# Patient Record
Sex: FEMALE | Race: OTHER | Employment: FULL TIME | URBAN - METROPOLITAN AREA
[De-identification: names, ages, dates, MRNs, and addresses within clinical notes are randomized per-mention and may not be internally consistent; named-entity substitution may affect disease eponyms.]

---

## 2020-08-05 ENCOUNTER — HOSPITAL ENCOUNTER (OUTPATIENT)
Dept: GENERAL RADIOLOGY | Age: 45
Discharge: HOME | End: 2020-08-05
Payer: MEDICAID

## 2020-08-05 DIAGNOSIS — M54.9 BACK PAIN: ICD-10-CM

## 2020-08-05 PROCEDURE — 74019 RADEX ABDOMEN 2 VIEWS: CPT

## 2021-12-21 ENCOUNTER — HOSPITAL ENCOUNTER (OUTPATIENT)
Dept: GENERAL RADIOLOGY | Age: 46
Discharge: HOME | End: 2021-12-21
Payer: MEDICAID

## 2021-12-21 DIAGNOSIS — K59.00 CONSTIPATION: ICD-10-CM

## 2021-12-21 PROCEDURE — 74018 RADEX ABDOMEN 1 VIEW: CPT

## 2022-03-30 ENCOUNTER — TRANSCRIBE ORDER (OUTPATIENT)
Dept: SCHEDULING | Age: 47
End: 2022-03-30

## 2022-03-30 DIAGNOSIS — Z12.39 BREAST CANCER SCREENING: Primary | ICD-10-CM

## 2024-06-29 ENCOUNTER — HOSPITAL ENCOUNTER (OUTPATIENT)
Facility: HOSPITAL | Age: 49
End: 2024-06-29
Payer: MEDICAID

## 2024-06-29 DIAGNOSIS — N20.0 KIDNEY STONE: ICD-10-CM

## 2024-06-29 PROCEDURE — 76770 US EXAM ABDO BACK WALL COMP: CPT

## 2024-07-05 ENCOUNTER — HOSPITAL ENCOUNTER (OUTPATIENT)
Facility: HOSPITAL | Age: 49
Discharge: HOME | End: 2024-07-08

## 2024-07-05 ENCOUNTER — OFFICE VISIT (OUTPATIENT)
Facility: CLINIC | Age: 49
End: 2024-07-05
Payer: MEDICAID

## 2024-07-05 VITALS
WEIGHT: 205 LBS | HEIGHT: 68 IN | DIASTOLIC BLOOD PRESSURE: 86 MMHG | SYSTOLIC BLOOD PRESSURE: 128 MMHG | HEART RATE: 96 BPM | BODY MASS INDEX: 31.07 KG/M2 | TEMPERATURE: 97.8 F | OXYGEN SATURATION: 94 % | RESPIRATION RATE: 18 BRPM

## 2024-07-05 DIAGNOSIS — N20.0 RENAL STONE: ICD-10-CM

## 2024-07-05 DIAGNOSIS — M54.50 LOW BACK PAIN, UNSPECIFIED BACK PAIN LATERALITY, UNSPECIFIED CHRONICITY, UNSPECIFIED WHETHER SCIATICA PRESENT: ICD-10-CM

## 2024-07-05 DIAGNOSIS — Z13.29 SCREENING FOR ENDOCRINE, METABOLIC AND IMMUNITY DISORDER: ICD-10-CM

## 2024-07-05 DIAGNOSIS — Z13.228 SCREENING FOR ENDOCRINE, METABOLIC AND IMMUNITY DISORDER: ICD-10-CM

## 2024-07-05 DIAGNOSIS — Z12.4 SCREENING FOR CERVICAL CANCER: ICD-10-CM

## 2024-07-05 DIAGNOSIS — R91.1 LUNG NODULE: ICD-10-CM

## 2024-07-05 DIAGNOSIS — Z12.31 ENCOUNTER FOR SCREENING MAMMOGRAM FOR MALIGNANT NEOPLASM OF BREAST: ICD-10-CM

## 2024-07-05 DIAGNOSIS — Z13.0 SCREENING FOR ENDOCRINE, METABOLIC AND IMMUNITY DISORDER: ICD-10-CM

## 2024-07-05 DIAGNOSIS — Z12.11 SCREEN FOR COLON CANCER: Primary | ICD-10-CM

## 2024-07-05 DIAGNOSIS — K59.00 CONSTIPATION, UNSPECIFIED CONSTIPATION TYPE: ICD-10-CM

## 2024-07-05 LAB — LABCORP SPECIMEN COLLECTION: NORMAL

## 2024-07-05 PROCEDURE — 99204 OFFICE O/P NEW MOD 45 MIN: CPT | Performed by: INTERNAL MEDICINE

## 2024-07-05 PROCEDURE — 99001 SPECIMEN HANDLING PT-LAB: CPT

## 2024-07-05 RX ORDER — POLYETHYLENE GLYCOL 3350 17 G/17G
17 POWDER, FOR SOLUTION ORAL DAILY
Qty: 1530 G | Refills: 0 | Status: SHIPPED | OUTPATIENT
Start: 2024-07-05

## 2024-07-05 SDOH — ECONOMIC STABILITY: FOOD INSECURITY: WITHIN THE PAST 12 MONTHS, THE FOOD YOU BOUGHT JUST DIDN'T LAST AND YOU DIDN'T HAVE MONEY TO GET MORE.: NEVER TRUE

## 2024-07-05 SDOH — ECONOMIC STABILITY: HOUSING INSECURITY
IN THE LAST 12 MONTHS, WAS THERE A TIME WHEN YOU DID NOT HAVE A STEADY PLACE TO SLEEP OR SLEPT IN A SHELTER (INCLUDING NOW)?: NO

## 2024-07-05 SDOH — ECONOMIC STABILITY: INCOME INSECURITY: HOW HARD IS IT FOR YOU TO PAY FOR THE VERY BASICS LIKE FOOD, HOUSING, MEDICAL CARE, AND HEATING?: NOT HARD AT ALL

## 2024-07-05 SDOH — ECONOMIC STABILITY: FOOD INSECURITY: WITHIN THE PAST 12 MONTHS, YOU WORRIED THAT YOUR FOOD WOULD RUN OUT BEFORE YOU GOT MONEY TO BUY MORE.: NEVER TRUE

## 2024-07-05 ASSESSMENT — PATIENT HEALTH QUESTIONNAIRE - PHQ9
1. LITTLE INTEREST OR PLEASURE IN DOING THINGS: NOT AT ALL
SUM OF ALL RESPONSES TO PHQ QUESTIONS 1-9: 0
SUM OF ALL RESPONSES TO PHQ9 QUESTIONS 1 & 2: 0
SUM OF ALL RESPONSES TO PHQ QUESTIONS 1-9: 0
2. FEELING DOWN, DEPRESSED OR HOPELESS: NOT AT ALL
SUM OF ALL RESPONSES TO PHQ QUESTIONS 1-9: 0
SUM OF ALL RESPONSES TO PHQ QUESTIONS 1-9: 0

## 2024-07-05 ASSESSMENT — ENCOUNTER SYMPTOMS
CONSTIPATION: 1
BACK PAIN: 1
ALLERGIC/IMMUNOLOGIC NEGATIVE: 1
EYES NEGATIVE: 1
RESPIRATORY NEGATIVE: 1

## 2024-07-05 NOTE — PROGRESS NOTES
\"Have you been to the ER, urgent care clinic since your last visit?  Hospitalized since your last visit?\"    NO    “Have you seen or consulted any other health care providers outside of [facility] since your last visit?”    NO    “Have you had a colorectal cancer screening such as a colonoscopy/FIT/Cologuard?    NO    No colonoscopy on file  No cologuard on file  No FIT/FOBT on file   No flexible sigmoidoscopy on file        Have you had a mammogram?”   NO    Date of last Mammogram: 6/27/2017      “Have you had a pap smear?”    NO    No cervical cancer screening on file

## 2024-07-05 NOTE — PROGRESS NOTES
Subjective:   Hannah Perez was seen today for New Patient    Patient does not have any chest pain or palpitations or shortness of breath.  No   symptoms.      Patient has history of renal stones. Patient follows with urology.    Recent CT scan on 5/17 had shown   5-6 mm diameter distal right ureteral calculus just above the UVJ causing obstruction.   2. Tiny nonobstructing right upper pole renal calculus and tiny calculus within dependent portion of bladder.   3. Enlarged multi fibroid uterus.   4. 13 mm nodule right lower lobe. Possibility of primary or metastatic neoplasm should be considered. Further evaluation with CT dedicated to the chest suggested.     Patient also complains of low back pain.  Patient would like to follow-up with spine surgery.    Patient is overdue for mammogram, colonoscopy, Pap smear.    Patient does not smoke or do drugs.  Patient  take alcohol occasionally.      Past Medical History:   Diagnosis Date    Hypertension     Kidney stone        Past Surgical History:   Procedure Laterality Date    ENDOMETRIAL ABLATION         Family History   Problem Relation Age of Onset    Ovarian Cancer Mother        Social History     Socioeconomic History    Marital status: Single     Spouse name: Not on file    Number of children: Not on file    Years of education: Not on file    Highest education level: Not on file   Occupational History    Not on file   Tobacco Use    Smoking status: Never    Smokeless tobacco: Never   Vaping Use    Vaping Use: Never used   Substance and Sexual Activity    Alcohol use: Not Currently     Alcohol/week: 1.0 standard drink of alcohol     Types: 1 Shots of liquor per week    Drug use: Never    Sexual activity: Not Currently   Other Topics Concern    Not on file   Social History Narrative    Not on file     Social Determinants of Health     Financial Resource Strain: Low Risk  (7/5/2024)    Overall Financial Resource Strain (CARDIA)     Difficulty of Paying Living

## 2024-07-06 LAB
ALBUMIN SERPL-MCNC: 4.3 G/DL (ref 3.9–4.9)
ALP SERPL-CCNC: 119 IU/L (ref 44–121)
ALT SERPL-CCNC: 18 IU/L (ref 0–32)
AST SERPL-CCNC: 18 IU/L (ref 0–40)
BASOPHILS # BLD AUTO: 0 X10E3/UL (ref 0–0.2)
BASOPHILS NFR BLD AUTO: 1 %
BILIRUB SERPL-MCNC: 0.9 MG/DL (ref 0–1.2)
BUN SERPL-MCNC: 14 MG/DL (ref 6–24)
BUN/CREAT SERPL: 18 (ref 9–23)
CALCIUM SERPL-MCNC: 9.2 MG/DL (ref 8.7–10.2)
CHLORIDE SERPL-SCNC: 103 MMOL/L (ref 96–106)
CHOLEST SERPL-MCNC: 197 MG/DL (ref 100–199)
CO2 SERPL-SCNC: 22 MMOL/L (ref 20–29)
CREAT SERPL-MCNC: 0.79 MG/DL (ref 0.57–1)
EGFRCR SERPLBLD CKD-EPI 2021: 92 ML/MIN/1.73
EOSINOPHIL # BLD AUTO: 0.1 X10E3/UL (ref 0–0.4)
EOSINOPHIL NFR BLD AUTO: 2 %
ERYTHROCYTE [DISTWIDTH] IN BLOOD BY AUTOMATED COUNT: 14.5 % (ref 11.7–15.4)
GLOBULIN SER CALC-MCNC: 2.9 G/DL (ref 1.5–4.5)
GLUCOSE SERPL-MCNC: 83 MG/DL (ref 70–99)
HBA1C MFR BLD: 5.9 % (ref 4.8–5.6)
HCT VFR BLD AUTO: 42.8 % (ref 34–46.6)
HDLC SERPL-MCNC: 54 MG/DL
HGB BLD-MCNC: 13.5 G/DL (ref 11.1–15.9)
IMM GRANULOCYTES # BLD AUTO: 0 X10E3/UL (ref 0–0.1)
IMM GRANULOCYTES NFR BLD AUTO: 0 %
LDLC SERPL CALC-MCNC: 126 MG/DL (ref 0–99)
LYMPHOCYTES # BLD AUTO: 1.8 X10E3/UL (ref 0.7–3.1)
LYMPHOCYTES NFR BLD AUTO: 43 %
MCH RBC QN AUTO: 26.9 PG (ref 26.6–33)
MCHC RBC AUTO-ENTMCNC: 31.5 G/DL (ref 31.5–35.7)
MCV RBC AUTO: 85 FL (ref 79–97)
MONOCYTES # BLD AUTO: 0.4 X10E3/UL (ref 0.1–0.9)
MONOCYTES NFR BLD AUTO: 10 %
NEUTROPHILS # BLD AUTO: 1.8 X10E3/UL (ref 1.4–7)
NEUTROPHILS NFR BLD AUTO: 44 %
PLATELET # BLD AUTO: 339 X10E3/UL (ref 150–450)
POTASSIUM SERPL-SCNC: 4.2 MMOL/L (ref 3.5–5.2)
PROT SERPL-MCNC: 7.2 G/DL (ref 6–8.5)
RBC # BLD AUTO: 5.02 X10E6/UL (ref 3.77–5.28)
SODIUM SERPL-SCNC: 141 MMOL/L (ref 134–144)
TRIGL SERPL-MCNC: 94 MG/DL (ref 0–149)
VLDLC SERPL CALC-MCNC: 17 MG/DL (ref 5–40)
WBC # BLD AUTO: 4.1 X10E3/UL (ref 3.4–10.8)

## 2024-08-12 RX ORDER — NITROFURANTOIN 25; 75 MG/1; MG/1
100 CAPSULE ORAL EVERY 12 HOURS
COMMUNITY

## 2024-08-21 ENCOUNTER — OFFICE VISIT (OUTPATIENT)
Age: 49
End: 2024-08-21
Payer: MEDICAID

## 2024-08-21 VITALS
WEIGHT: 206.8 LBS | OXYGEN SATURATION: 97 % | HEART RATE: 98 BPM | BODY MASS INDEX: 31.34 KG/M2 | DIASTOLIC BLOOD PRESSURE: 97 MMHG | TEMPERATURE: 98 F | RESPIRATION RATE: 18 BRPM | HEIGHT: 68 IN | SYSTOLIC BLOOD PRESSURE: 155 MMHG

## 2024-08-21 DIAGNOSIS — R91.1 LUNG NODULE: Primary | ICD-10-CM

## 2024-08-21 DIAGNOSIS — R06.02 SHORTNESS OF BREATH: ICD-10-CM

## 2024-08-21 PROCEDURE — 99204 OFFICE O/P NEW MOD 45 MIN: CPT | Performed by: HOSPITALIST

## 2024-08-21 ASSESSMENT — PATIENT HEALTH QUESTIONNAIRE - PHQ9
1. LITTLE INTEREST OR PLEASURE IN DOING THINGS: NOT AT ALL
SUM OF ALL RESPONSES TO PHQ QUESTIONS 1-9: 0
SUM OF ALL RESPONSES TO PHQ9 QUESTIONS 1 & 2: 0
2. FEELING DOWN, DEPRESSED OR HOPELESS: NOT AT ALL
SUM OF ALL RESPONSES TO PHQ QUESTIONS 1-9: 0

## 2024-08-21 NOTE — PROGRESS NOTES
Hannah Hoffmanlett presents today for   Chief Complaint   Patient presents with    Pulmonary Nodule       Is someone accompanying this pt? No    Is the patient using any DME equipment during OV? No    -DME Company No    Depression Screenin/21/2024     9:14 AM   PHQ-9 Questionaire   Little interest or pleasure in doing things 0   Feeling down, depressed, or hopeless 0   PHQ-9 Total Score 0       Learning Assessment:    Failed to redirect to the Timeline version of the [employer] SmartLink.    Abuse Screening:         No data to display                Fall Risk    Failed to redirect to the Timeline version of the [employer] SmartLink.    Coordination of Care:    1. Have you been to the ER, urgent care clinic since your last visit? Hospitalized since your last visit? No    2. Have you seen or consulted any other health care providers outside of the [facility] since your last visit? Include any pap smears or colon screening. No    Medication list has been update per patient.

## 2024-08-21 NOTE — PROGRESS NOTES
[facility]                                                       Pulmonary, Critical Care, and Sleep Medicine      Pulmonary Office Initial Consultation.    Name: Hannah Perez     : 1975     Date: 2024        Subjective:   Chief complaint: lung nodule  Patient is a 48 y.o. female with a PMHx of nephrolithiasis presenting for evaluation of lung nodule found incidentally on CTAP    HPI :  Today in clinic, she states she is here to discuss the finding of a lung nodule on recent CT of abdomen pelvis done May 2024.  She does have some dyspnea exertion which feels winded with activity but denies associated cough, wheeze, sputum production, hemoptysis and usually it improves when she sits down.  She has no known history of structural lung disease to include COPD or asthma.  She does have symptoms of lower back pain which he has not coming appointment with the spine specialist for, but denies symptoms of fever, chills, nausea, vomiting, chest pain or tightness, abdominal pain, or significant leg swelling.  She has never been on any inhalers or respiratory treatments.  Denies symptoms of nasal congestion or history respiratory illnesses.  She is a lifelong non-smoker.  Also denies marijuana, vaping, or other substance use.  Denies significant travel history.  Denies risk of exposure to dust, mold, chemicals or job.  Does note some occasional mildew in her house but no significant water leak or damage, or animal exposure.  As far she is aware no history of TB exposure.  She is not up-to-date influenza or COVID-vaccine and declines as vaccines despite counseling on risks    CT scan result was discussed with her including characteristics of nodule and her personal risk.  She has no personal history of malignancy.  She does note a family history ovarian cancer in her mother side was diagnosed between age 50 and 60.  Denies any

## 2024-08-28 NOTE — PROGRESS NOTES
[facility]  [address]  [place] [address]  Tel: [phone]  Fax: [phone]          INITIAL CONSULTATION      HISTORY OF PRESENT ILLNESS:  Hannah Perez is a 48 y.o. female who is referred from Charly Abernathy MD secondary to low back pain. She rates her pain  9 /10. Patient comes into the office with c/o lower back pain with symptoms into the right groin down the right lateral thigh ongoing for 2+ months with no injury    Patient says her pain has been holding steady since the initial onset. She denies change in bowel or bladder habits. She says to her knowledge she is not pregnant, trying to become pregnant, or breast feeding at this time. Her pain is exacerbated by standing and bending over She denies recent fevers, weight loss, rashes, or skin sores. She denies a hx of stomach ulcers or bleeding disorders. She denies a hx of history of spinal surgery or injections. She denies recent physical therapy or chiropractic care. She denies a hx of DM. She reports that to her knowledge her kidneys function properly, GFR of 92 on 7/5/2024. She has not taken anything for her pain.       PmHx of HTN, lung nodules, kidney stones     Note from Dr. Abernathy dated 7/5/2024 indicating patient was seen for hx of renal stones followed by urology.       The patient is Right Handed.  reviewed. Body mass index is 31.93 kg/m².    PCP: Charly Abernathy MD    Past Medical History:   Diagnosis Date    Hypertension     Kidney stone           Past Surgical History:   Procedure Laterality Date    ENDOMETRIAL ABLATION           Social History     Tobacco Use    Smoking status: Never     Passive exposure: Never    Smokeless tobacco: Never   Substance Use Topics    Alcohol use: Not Currently     Alcohol/week: 1.0 standard drink of alcohol     Types: 1 Shots of liquor per week       Work status: N/A.  Marital status: N/A.     Current Outpatient Medications   Medication Sig Dispense  a Medrol Dosepak, followed by Motrin 800 mg TID with food for 2 weeks after the MDP. I told her to go to the ER if she experiences bowel or bladder incontinence. I told her to call the office if she is experiencing BLE weakness. I will provide the pt with a work note prohibiting her from lifting >10lbs and to limit pushing and pulling. The patient is Neurologically intact. I will see the patient back in 2 months or earlier if needed.       Written by jenn Foster scribe, as dictated by Mikey Canela MD  I examined the patient, reviewed and agree with the note.

## 2024-08-29 ENCOUNTER — OFFICE VISIT (OUTPATIENT)
Age: 49
End: 2024-08-29
Payer: MEDICAID

## 2024-08-29 VITALS — WEIGHT: 210 LBS | TEMPERATURE: 97 F | BODY MASS INDEX: 31.83 KG/M2 | HEIGHT: 68 IN

## 2024-08-29 DIAGNOSIS — M54.16 LUMBAR NEURITIS: ICD-10-CM

## 2024-08-29 DIAGNOSIS — G89.29 CHRONIC BILATERAL LOW BACK PAIN, UNSPECIFIED WHETHER SCIATICA PRESENT: Primary | ICD-10-CM

## 2024-08-29 DIAGNOSIS — M54.50 CHRONIC BILATERAL LOW BACK PAIN, UNSPECIFIED WHETHER SCIATICA PRESENT: Primary | ICD-10-CM

## 2024-08-29 DIAGNOSIS — M51.36 DDD (DEGENERATIVE DISC DISEASE), LUMBAR: ICD-10-CM

## 2024-08-29 PROCEDURE — 72110 X-RAY EXAM L-2 SPINE 4/>VWS: CPT | Performed by: PHYSICAL MEDICINE & REHABILITATION

## 2024-08-29 PROCEDURE — 99204 OFFICE O/P NEW MOD 45 MIN: CPT | Performed by: PHYSICAL MEDICINE & REHABILITATION

## 2024-08-29 RX ORDER — METHYLPREDNISOLONE 4 MG
TABLET, DOSE PACK ORAL
Qty: 1 KIT | Refills: 0 | Status: SHIPPED | OUTPATIENT
Start: 2024-08-29

## 2024-08-29 RX ORDER — IBUPROFEN 800 MG/1
800 TABLET, FILM COATED ORAL 3 TIMES DAILY PRN
Qty: 45 TABLET | Refills: 0 | Status: SHIPPED | OUTPATIENT
Start: 2024-08-29

## 2024-08-29 NOTE — PATIENT INSTRUCTIONS
Dr Canela is ordering a Medrol Dosepak (steroid pack) and motrin 800 mg to your pharmacy. You may start the Medrol immediately, but do not take the motrin or any other anti-inflammatory medication until the Medrol Dosepak is completed.    You will receive a call from physical therapy. If you do not hear from them in over a week, please call.

## 2024-09-04 ENCOUNTER — TELEPHONE (OUTPATIENT)
Age: 49
End: 2024-09-04

## 2024-09-04 NOTE — TELEPHONE ENCOUNTER
Patient called to request that her last work note include that the restrictions will be reevaluated at her next visit with us 10/21/2024 if possible. She is on her way to the [facility] office to get the updated letter per her HR.

## 2024-09-04 NOTE — TELEPHONE ENCOUNTER
Patient presented to [facility] office and letter was rewritten as requested.

## 2024-09-12 ENCOUNTER — HOSPITAL ENCOUNTER (OUTPATIENT)
Facility: HOSPITAL | Age: 49
Setting detail: RECURRING SERIES
Discharge: HOME | End: 2024-09-15
Payer: MEDICAID

## 2024-09-12 PROCEDURE — 97162 PT EVAL MOD COMPLEX 30 MIN: CPT | Performed by: PHYSICAL THERAPIST

## 2024-09-25 ENCOUNTER — TELEPHONE (OUTPATIENT)
Facility: HOSPITAL | Age: 49
End: 2024-09-25

## 2024-10-08 ENCOUNTER — HOSPITAL ENCOUNTER (OUTPATIENT)
Facility: HOSPITAL | Age: 49
Setting detail: RECURRING SERIES
Discharge: HOME | End: 2024-10-11
Payer: MEDICAID

## 2024-10-08 PROCEDURE — 97112 NEUROMUSCULAR REEDUCATION: CPT

## 2024-10-08 PROCEDURE — 97110 THERAPEUTIC EXERCISES: CPT

## 2024-10-08 NOTE — PROGRESS NOTES
PHYSICAL / OCCUPATIONAL THERAPY - DAILY TREATMENT NOTE    Patient Name: Hannah Perez    Date: 10/8/2024    : 1975  Insurance: Payor: [facility] MEDICAID / Plan: [facility] EXP ANTH HEALTHKEEPERS PLUS / Product Type: *No Product type* /      Patient  verified Yes     Visit #   Current / Total 2 8   Time   In / Out 3:40 4:15   Pain   In / Out 5 5   Subjective Functional Status/Changes: Pt. Has no new complaints today.     TREATMENT AREA =  Other low back pain [M54.59]     OBJECTIVE    Therapeutic Procedures:    13484 Therapeutic Exercise (timed):  increase ROM, strength, coordination, balance, and proprioception to improve patient's ability to progress to PLOF and address remaining functional goals.   Tx Min Billable or 1:1 Min   (if diff from Tx Min) Details:   15  See flow sheet as applicable     60449 Neuromuscular Re-Education (timed):  improve balance, coordination, kinesthetic sense, posture, core stability and proprioception to improve patient's ability to develop conscious control of individual muscles and awareness of position of extremities in order to progress to PLOF and address remaining functional goals.   Tx Min Billable or 1:1 Min   (if diff from Tx Min) Details:   10  See flow sheet as applicable and MET to decrease L lumbar paraspinal hypomobility     56802 Therapeutic Activity (timed):  use of dynamic activities replicating functional movements to increase ROM, strength, coordination, balance, and proprioception in order to improve patient's ability to progress to PLOF and address remaining functional goals.   Tx Min Billable or 1:1 Min   (if diff from Tx Min) Details:   10  See flow sheet as applicable       35  [facility] Totals Reminder: bill using total billable min of TIMED therapeutic procedures (example: do not include dry needle or estim unattended, both untimed codes, in totals to left)  8-22 min = 1 unit; 23-37 min = 2 units; 38-52 min = 3 units; 53-67 min = 4 units; 68-82 min

## 2024-10-10 ENCOUNTER — HOSPITAL ENCOUNTER (OUTPATIENT)
Facility: HOSPITAL | Age: 49
Setting detail: RECURRING SERIES
Discharge: HOME | End: 2024-10-13
Payer: MEDICAID

## 2024-10-10 PROCEDURE — 97530 THERAPEUTIC ACTIVITIES: CPT

## 2024-10-10 PROCEDURE — 97110 THERAPEUTIC EXERCISES: CPT

## 2024-10-10 PROCEDURE — 97112 NEUROMUSCULAR REEDUCATION: CPT

## 2024-10-10 NOTE — PROGRESS NOTES
PHYSICAL / OCCUPATIONAL THERAPY - DAILY TREATMENT NOTE    Patient Name: Hannah Perez    Date: 10/10/2024    : 1975  Insurance: Payor: [facility] MEDICAID / Plan: [facility] EXP ANTH HEALTHKEEPERS PLUS / Product Type: *No Product type* /      Patient  verified Yes     Visit #   Current / Total 3 8   Time   In / Out 417 514   Pain   In / Out 6 6   Subjective Functional Status/Changes: Patient states she has not been performing her home exercises regularly.      TREATMENT AREA =  Other low back pain [M54.59]     OBJECTIVE    Heat (UNBILLED):  location/position: prone; back  .    Min Rationale   10 decrease pain to improve patient's ability to progress to PLOF and address remaining functional goals.     Skin assessment post-treatment:   Intact     Therapeutic Procedures:    10519 Therapeutic Exercise (timed):  increase ROM, strength, coordination, balance, and proprioception to improve patient's ability to progress to PLOF and address remaining functional goals.   Tx Min Billable or 1:1 Min   (if diff from Tx Min) Details:   17 15 See flow sheet as applicable     48933 Neuromuscular Re-Education (timed):  improve balance, coordination, kinesthetic sense, posture, core stability and proprioception to improve patient's ability to develop conscious control of individual muscles and awareness of position of extremities in order to progress to PLOF and address remaining functional goals.   Tx Min Billable or 1:1 Min   (if diff from Tx Min) Details:   15 15 See flow sheet as applicable     66438 Therapeutic Activity (timed):  use of dynamic activities replicating functional movements to increase ROM, strength, coordination, balance, and proprioception in order to improve patient's ability to progress to PLOF and address remaining functional goals.   Tx Min Billable or 1:1 Min   (if diff from Tx Min) Details:   15 8 See flow sheet as applicable       47 38 [facility] Totals Reminder: bill using total billable min

## 2024-10-10 NOTE — THERAPY RECERTIFICATION
[facility]  [address] [address] Ph: [phone] Fx: [phone]  PHYSICAL THERAPY PROGRESS NOTE  Patient Name: Hannah Perez : 1975   Treatment/Medical Diagnosis: Other low back pain [M54.59]   Referral Source: Mikey Canela MD     Date of Initial Visit: 2024 Attended Visits: 3 Missed Visits: 0     SUMMARY OF TREATMENT  Patient has made little to no progress toward set therapy goals due to only having two follow up appointments since evaluation on 24 without being compliant with home exercise program.     CURRENT STATUS  Short Term Goals: To be accomplished in 1 WEEKS  1.  Patient will become proficient in their HEP and will be compliant in performing that program.  Evaluation:   Patient given a written/illustrated HEP.  Current: not met; Pt has initiated HEP without full compliance.   2.  Patient's pain level will be 3-4/10 with activity in order to improve patient's ability to perform normal ADLs.  Evaluation:  4/10-8/10  Current: remains: pain at worse 8/10; lowest pain 3/10. 10/10/2024     Long Term Goals: To be accomplished in 4 WEEKS  1. Patient's pain level will be 2-3 with activity in order to improve patient's ability to perform normal ADLs.  Evaluation:  4/10-8/10  Current: pain at worse 8/10; lowest pain 3/10. 10/10/2024  2. Patient will demonstrate AROM Lumbar Spine flexion and rotation WNL with only minimal discomfort to increase ease of ADLs.  Evaluation:  AROM is WNL with pain on flex, and rotation  Current: remains AROM is WNL with pain on flex, and rotation 10/10/24  3. Patient will improve her Oswestry score to  below 25% to indicate increased functional mobility.  Evaluation:  ROCIO score = 42%  Current; regressed; ROCIO = 64%. 10/10/24  4. Patient will tolerate being able to do light household chores in order to increase her functional ADLs.  Evaluation:  Patient notes she is unable to do her normal

## 2024-10-15 ENCOUNTER — TELEPHONE (OUTPATIENT)
Facility: HOSPITAL | Age: 49
End: 2024-10-15

## 2024-10-15 NOTE — TELEPHONE ENCOUNTER
Patient was approved for 8 visits exp 11/12/2024. Called patient to schedule visits. No answer. Left a voicemail.

## 2024-10-17 ENCOUNTER — HOSPITAL ENCOUNTER (OUTPATIENT)
Facility: HOSPITAL | Age: 49
Discharge: HOME | End: 2024-10-20
Attending: HOSPITALIST
Payer: MEDICAID

## 2024-10-17 DIAGNOSIS — R91.1 LUNG NODULE: ICD-10-CM

## 2024-10-17 PROCEDURE — A9552 F18 FDG: HCPCS | Performed by: HOSPITALIST

## 2024-10-17 PROCEDURE — 3430000000 HC RX DIAGNOSTIC RADIOPHARMACEUTICAL: Performed by: HOSPITALIST

## 2024-10-17 PROCEDURE — A9609 HC RX DIAGNOSTIC RADIOPHARMACEUTICAL: HCPCS | Performed by: HOSPITALIST

## 2024-10-17 PROCEDURE — 78815 PET IMAGE W/CT SKULL-THIGH: CPT

## 2024-10-17 PROCEDURE — 2500000003 HC RX 250 WO HCPCS: Performed by: HOSPITALIST

## 2024-10-17 RX ORDER — FLUDEOXYGLUCOSE F-18 500 MCI/ML
12.04 INJECTION INTRAVENOUS
Status: COMPLETED | OUTPATIENT
Start: 2024-10-17 | End: 2024-10-17

## 2024-10-17 RX ADMIN — FLUDEOXYGLUCOSE F-18 12.04 MILLICURIE: 500 INJECTION INTRAVENOUS at 07:39

## 2024-10-17 RX ADMIN — BARIUM SULFATE 450 ML: 20 SUSPENSION ORAL at 07:39

## 2024-10-21 ENCOUNTER — OFFICE VISIT (OUTPATIENT)
Age: 49
End: 2024-10-21
Payer: MEDICAID

## 2024-10-21 VITALS
WEIGHT: 210 LBS | OXYGEN SATURATION: 99 % | BODY MASS INDEX: 31.83 KG/M2 | HEIGHT: 68 IN | HEART RATE: 100 BPM | TEMPERATURE: 98 F

## 2024-10-21 DIAGNOSIS — G89.29 CHRONIC BILATERAL LOW BACK PAIN, UNSPECIFIED WHETHER SCIATICA PRESENT: Primary | ICD-10-CM

## 2024-10-21 DIAGNOSIS — M54.16 LUMBAR NEURITIS: ICD-10-CM

## 2024-10-21 DIAGNOSIS — M51.362 DEGENERATION OF INTERVERTEBRAL DISC OF LUMBAR REGION WITH DISCOGENIC BACK PAIN AND LOWER EXTREMITY PAIN: ICD-10-CM

## 2024-10-21 DIAGNOSIS — M54.50 CHRONIC BILATERAL LOW BACK PAIN, UNSPECIFIED WHETHER SCIATICA PRESENT: Primary | ICD-10-CM

## 2024-10-21 PROCEDURE — 99214 OFFICE O/P EST MOD 30 MIN: CPT | Performed by: PHYSICAL MEDICINE & REHABILITATION

## 2024-10-21 NOTE — PROGRESS NOTES
[facility]  [address]  [place] [id number]  Tel: [phone]  Fax: [phone]          PROGRESS NOTE      HISTORY OF PRESENT ILLNESS:  The patient is a 48 y.o. female and was seen today for follow up of centralized lower back pain. Previously seen for lower back pain with symptoms into the right groin radiating down the right lateral thigh. Patient says her pain has been holding steady since the initial onset. She denies change in bowel or bladder habits. She says to her knowledge she is not pregnant, trying to become pregnant, or breast feeding at this time. Her pain is exacerbated by standing and bending over She denies recent fevers, weight loss, rashes, or skin sores. She denies a hx of stomach ulcers or bleeding disorders. She denies a hx of history of spinal surgery or injections. She denies recent physical therapy or chiropractic care. She denies a hx of DM. She reports that to her knowledge her kidneys function properly, GFR of 92 on 7/5/2024. She has not taken anything for her pain. PmHx of HTN, lung nodules, kidney stones. Note from Dr. Abernathy dated 7/5/2024 indicating patient was seen for hx of renal stones followed by urology. The patient is Right Handed. At her last clinical appointment, I will refer her to physical therapy with an emphasis on HEP.  I started her on a Medrol Dosepak, followed by Motrin 800 mg TID with food for 2 weeks after the MDP. I told her to go to the ER if she experiences bowel or bladder incontinence. I told her to call the office if she is experiencing BLE weakness. I will provide the pt with a work note prohibiting her from lifting >10lbs and to limit pushing and pulling. The patient is Neurologically intact. I will see the patient back in 2 months or earlier if needed.       The patient returns today with  centralized lower back pain.  She rates her pain  2-6 /10, previously 9/10. Patient says that overall her pain

## 2024-10-24 ENCOUNTER — HOSPITAL ENCOUNTER (OUTPATIENT)
Facility: HOSPITAL | Age: 49
Setting detail: RECURRING SERIES
Discharge: HOME | End: 2024-10-27
Payer: MEDICAID

## 2024-10-24 PROCEDURE — 97112 NEUROMUSCULAR REEDUCATION: CPT

## 2024-10-24 PROCEDURE — 97110 THERAPEUTIC EXERCISES: CPT

## 2024-10-24 PROCEDURE — 97530 THERAPEUTIC ACTIVITIES: CPT

## 2024-10-24 NOTE — PROGRESS NOTES
PHYSICAL / OCCUPATIONAL THERAPY - DAILY TREATMENT NOTE    Patient Name: Hannah Perez    Date: 10/24/2024    : 1975  Insurance: Payor: [facility] MEDICAID / Plan: [facility] EXP ANTH HEALTHKEEPERS PLUS / Product Type: *No Product type* /      Patient  verified Yes     Visit #   Current / Total 1 10   Time   In / Out 340 431   Pain   In / Out 5 7   Subjective Functional Status/Changes: Patient states she had increase in back pain when she perform supine hip flexor stretch.      TREATMENT AREA =  Other low back pain [M54.59]     OBJECTIVE       Heat (UNBILLED):  location/position: prone; back  .    Min Rationale   10 decrease pain to improve patient's ability to progress to PLOF and address remaining functional goals.     Skin assessment post-treatment:   Intact      Therapeutic Procedures:     74439 Therapeutic Exercise (timed):  increase ROM, strength, coordination, balance, and proprioception to improve patient's ability to progress to PLOF and address remaining functional goals.   Tx Min Billable or 1:1 Min   (if diff from Tx Min) Details:   15  See flow sheet as applicable      72882 Neuromuscular Re-Education (timed):  improve balance, coordination, kinesthetic sense, posture, core stability and proprioception to improve patient's ability to develop conscious control of individual muscles and awareness of position of extremities in order to progress to PLOF and address remaining functional goals.   Tx Min Billable or 1:1 Min   (if diff from Tx Min) Details:   15  See flow sheet as applicable      31798 Therapeutic Activity (timed):  use of dynamic activities replicating functional movements to increase ROM, strength, coordination, balance, and proprioception in order to improve patient's ability to progress to PLOF and address remaining functional goals.   Tx Min Billable or 1:1 Min   (if diff from Tx Min) Details:   11  See flow sheet as applicable         41   [facility] Totals Reminder: bill using

## 2024-10-25 ENCOUNTER — TELEPHONE (OUTPATIENT)
Age: 49
End: 2024-10-25

## 2024-10-25 NOTE — TELEPHONE ENCOUNTER
Called and left VM explaining that recent PET scan showed nodules is negative for uptake and likely benign.  Plan will be repeat CT Chest every 3-6 months

## 2024-10-28 ENCOUNTER — HOSPITAL ENCOUNTER (OUTPATIENT)
Facility: HOSPITAL | Age: 49
Setting detail: RECURRING SERIES
End: 2024-10-28
Payer: MEDICAID

## 2024-11-04 ENCOUNTER — HOSPITAL ENCOUNTER (OUTPATIENT)
Facility: HOSPITAL | Age: 49
Setting detail: RECURRING SERIES
Discharge: HOME | End: 2024-11-07
Payer: MEDICAID

## 2024-11-04 PROCEDURE — 97112 NEUROMUSCULAR REEDUCATION: CPT

## 2024-11-04 PROCEDURE — 97110 THERAPEUTIC EXERCISES: CPT

## 2024-11-04 NOTE — PROGRESS NOTES
PHYSICAL / OCCUPATIONAL THERAPY - DAILY TREATMENT NOTE    Patient Name: Hannah Perez    Date: 2024    : 1975  Insurance: Payor: [facility] MEDICAID / Plan: [facility] EXP ANTH HEALTHKEEPERS PLUS / Product Type: *No Product type* /      Patient  verified Yes     Visit #   Current / Total 2 10   Time   In / Out 344 429   Pain   In / Out 4-5 \"discomfort\"  8   Subjective Functional Status/Changes: Patient reports that she slept on the couch last night, which may have contributed to increased discomfort today. She notices some increased discomfort near her shoulders/mid back along her spine lately as well.      TREATMENT AREA =  Other low back pain [M54.59]     OBJECTIVE       Heat (UNBILLED):  location/position: prone; T/S/L/S  .    Min Rationale   10 decrease pain to improve patient's ability to progress to PLOF and address remaining functional goals.     Skin assessment post-treatment:   Intact      Therapeutic Procedures:     76648 Therapeutic Exercise (timed):  increase ROM, strength, coordination, balance, and proprioception to improve patient's ability to progress to PLOF and address remaining functional goals.   Tx Min Billable or 1:1 Min   (if diff from Tx Min) Details:   15 15 See flow sheet as applicable      74389 Neuromuscular Re-Education (timed):  improve balance, coordination, kinesthetic sense, posture, core stability and proprioception to improve patient's ability to develop conscious control of individual muscles and awareness of position of extremities in order to progress to PLOF and address remaining functional goals.   Tx Min Billable or 1:1 Min   (if diff from Tx Min) Details:   20 20 See flow sheet as applicable      43556 Therapeutic Activity (timed):  use of dynamic activities replicating functional movements to increase ROM, strength, coordination, balance, and proprioception in order to improve patient's ability to progress to PLOF and address remaining functional goals.

## 2024-11-07 ENCOUNTER — HOSPITAL ENCOUNTER (OUTPATIENT)
Facility: HOSPITAL | Age: 49
Setting detail: RECURRING SERIES
Discharge: HOME | End: 2024-11-10
Payer: MEDICAID

## 2024-11-07 ENCOUNTER — HOSPITAL ENCOUNTER (OUTPATIENT)
Facility: HOSPITAL | Age: 49
Setting detail: RECURRING SERIES
End: 2024-11-07
Payer: MEDICAID

## 2024-11-07 PROCEDURE — 97530 THERAPEUTIC ACTIVITIES: CPT

## 2024-11-07 PROCEDURE — 97112 NEUROMUSCULAR REEDUCATION: CPT

## 2024-11-07 PROCEDURE — 97110 THERAPEUTIC EXERCISES: CPT

## 2024-11-07 NOTE — PROGRESS NOTES
PHYSICAL / OCCUPATIONAL THERAPY - DAILY TREATMENT NOTE    Patient Name: Hannah Perez    Date: 2024    : 1975  Insurance: Payor: [facility] MEDICAID / Plan: [facility] EXP ANTH HEALTHKEEPERS PLUS / Product Type: *No Product type* /      Patient  verified Yes     Visit #   Current / Total 3 10   Time   In / Out 340 419   Pain   In / Out 4-5 6   Subjective Functional Status/Changes: Patient states she does not feel therapy is helping with her symptoms.      TREATMENT AREA =  Other low back pain [M54.59]     OBJECTIVE      Therapeutic Procedures:    98228 Therapeutic Exercise (timed):  increase ROM, strength, coordination, balance, and proprioception to improve patient's ability to progress to PLOF and address remaining functional goals.   Tx Min Billable or 1:1 Min   (if diff from Tx Min) Details:   15  See flow sheet as applicable     22919 Neuromuscular Re-Education (timed):  improve balance, coordination, kinesthetic sense, posture, core stability and proprioception to improve patient's ability to develop conscious control of individual muscles and awareness of position of extremities in order to progress to PLOF and address remaining functional goals.   Tx Min Billable or 1:1 Min   (if diff from Tx Min) Details:   15  See flow sheet as applicable     10402 Therapeutic Activity (timed):  use of dynamic activities replicating functional movements to increase ROM, strength, coordination, balance, and proprioception in order to improve patient's ability to progress to PLOF and address remaining functional goals.   Tx Min Billable or 1:1 Min   (if diff from Tx Min) Details:   9  See flow sheet as applicable       39  [facility] Totals Reminder: bill using total billable min of TIMED therapeutic procedures (example: do not include dry needle or estim unattended, both untimed codes, in totals to left)  8-22 min = 1 unit; 23-37 min = 2 units; 38-52 min = 3 units; 53-67 min = 4 units; 68-82 min = 5 units

## 2024-11-07 NOTE — THERAPY DISCHARGE
[facility]  [address] [facility] [address] Ph: [phone] Fx: [phone]  DISCHARGE SUMMARY  Patient Name: Hannah Perez : 1975   Treatment/Medical Diagnosis: Other low back pain [M54.59]   Referral Source: Mikey Canela MD     Date of Initial Visit: 2024 Attended Visits: 6 Missed Visits: 1     SUMMARY OF TREATMENT    Patient has made little to no improvement toward set therapy goals. Patient as attended 6 therapy session since initial evaluation on 24. Patient states she has notices no improvement with standing or sitting tolerance. Patient continues to have pain with lumbar AROM. Informed patient to follow up with doctor for further guidance.        CURRENT STATUS      1. Patient's pain level will be 2-3 with activity in order to improve patient's ability to perform normal ADLs.  AT PN: pain at worse 8/10; lowest pain 3/10. 10/10/2024  Current: progressing, 8/10 max 24     2. Patient will demonstrate AROM Lumbar Spine flexion and rotation WNL with only minimal discomfort to increase ease of ADLs.  AT PN: remains AROM is WNL with pain on flex, and rotation 10/10/24  Current: remains:  AROM is WNL with pain on flex, and rotation 2024     3. Patient will improve her Oswestry score to  below 25% to indicate increased functional mobility.  AT PN: regressed; ROCIO = 64%. 10/10/24  Current; progressing: ROCIO = 54% 2024     4. Patient will tolerate being able to do light household chores in order to increase her functional ADLs.  AT PN:  regressed: extreme difficulty with performing house hold chores. 10/10/24  Current: progress; quite a bit of difficulty with performing house hold chores. 24     5.  Patient will become proficient in their HEP and will be compliant in performing that program.  AT PN:  not met; Pt has initiated HEP without full compliance.    Current: MET: patient reports full compliance 24

## 2024-11-07 NOTE — PROGRESS NOTES
Physical Therapy Discharge Instructions      [facility] - [address] [address] Ph: [phone] Fx: [phone]    Patient: Hannah Perez  : 1975      Continue Home Exercise Program 1 times per day       Continue with    [] Ice  as needed   [] Heat           Follow up with MD:     [] Upon completion of therapy     [] As needed      Recommendations:     [x]   Return to activity with home program    []   Return to activity with the following modifications:       []Post Rehab Program    []Join Independent aquatic program     []Return to/join local gym      RAMON NOVAK, PTA 2024 4:07 PM

## 2024-11-12 ENCOUNTER — APPOINTMENT (OUTPATIENT)
Facility: HOSPITAL | Age: 49
End: 2024-11-12
Payer: MEDICAID

## 2024-11-14 ENCOUNTER — APPOINTMENT (OUTPATIENT)
Facility: HOSPITAL | Age: 49
End: 2024-11-14
Payer: MEDICAID

## 2024-11-19 ENCOUNTER — APPOINTMENT (OUTPATIENT)
Facility: HOSPITAL | Age: 49
End: 2024-11-19
Payer: MEDICAID

## 2024-11-21 ENCOUNTER — APPOINTMENT (OUTPATIENT)
Facility: HOSPITAL | Age: 49
End: 2024-11-21
Payer: MEDICAID

## 2024-11-21 ENCOUNTER — OFFICE VISIT (OUTPATIENT)
Age: 49
End: 2024-11-21

## 2024-11-21 VITALS
WEIGHT: 211.8 LBS | DIASTOLIC BLOOD PRESSURE: 99 MMHG | HEIGHT: 68 IN | OXYGEN SATURATION: 98 % | HEART RATE: 103 BPM | TEMPERATURE: 97.3 F | SYSTOLIC BLOOD PRESSURE: 155 MMHG | RESPIRATION RATE: 18 BRPM | BODY MASS INDEX: 32.1 KG/M2

## 2024-11-21 DIAGNOSIS — R91.1 LUNG NODULE: Primary | ICD-10-CM

## 2024-11-21 ASSESSMENT — PATIENT HEALTH QUESTIONNAIRE - PHQ9
SUM OF ALL RESPONSES TO PHQ9 QUESTIONS 1 & 2: 0
1. LITTLE INTEREST OR PLEASURE IN DOING THINGS: NOT AT ALL
SUM OF ALL RESPONSES TO PHQ QUESTIONS 1-9: 0
SUM OF ALL RESPONSES TO PHQ QUESTIONS 1-9: 0
2. FEELING DOWN, DEPRESSED OR HOPELESS: NOT AT ALL
SUM OF ALL RESPONSES TO PHQ QUESTIONS 1-9: 0
SUM OF ALL RESPONSES TO PHQ QUESTIONS 1-9: 0

## 2024-11-21 NOTE — PROGRESS NOTES
Hannah Hoffmanlett presents today for   Chief Complaint   Patient presents with    Pulmonary Nodule       Is someone accompanying this pt? no    Is the patient using any DME equipment during OV? no    -DME Company no    Depression Screenin/21/2024     9:23 AM   PHQ-9 Questionaire   Little interest or pleasure in doing things 0   Feeling down, depressed, or hopeless 0   PHQ-9 Total Score 0       Learning Assessment:    Failed to redirect to the Timeline version of the flikdate SmartLink.    Abuse Screening:         No data to display                Fall Risk    Failed to redirect to the Timeline version of the flikdate SmartLink.    Coordination of Care:    1. Have you been to the ER, urgent care clinic since your last visit? Hospitalized since your last visit? no    2. Have you seen or consulted any other health care providers outside of the [facility] since your last visit? Include any pap smears or colon screening. no    Medication list has been update per patient.

## 2024-11-21 NOTE — PROGRESS NOTES
[facility]  Pulmonary, Critical Care, and Sleep Medicine      Pulmonary Office Progress Notes    Name: Hannah Perez     : 1975     Date: 2024        Subjective:     Patient is a 48 y.o. female is here for follow up for: Problems- lung nodule  Since last encounter, PET scan was performed in the lung nodule of concern was found to not be significantly metabolically active.  There is also no evidence of FDG uptake in the mediastinum or other sites in the thorax.  She was called and notified of these results    24  Today, she reports feeling generally unchanged as last encounter.  PET results were reviewed with her.  She still has some dyspnea on exertion with feeling winded that is slightly improved in last encounter.  Continues to deny cough, wheeze, phlegm production, or hemoptysis.  Still has ongoing lower back pain.  No systemic symptoms to include fever, chills, night sweats, or weight loss.  Still declines vaccination despite risks.    HPI from initial encounter:  Today in clinic, she states she is here to discuss the finding of a lung nodule on recent CT of abdomen pelvis done May 2024.  She does have some dyspnea exertion which feels winded with activity but denies associated cough, wheeze, sputum production, hemoptysis and usually it improves when she sits down.  She has no known history of structural lung disease to include COPD or asthma.  She does have symptoms of lower back pain which he has not coming appointment with the spine specialist for, but denies symptoms of fever, chills, nausea, vomiting, chest pain or tightness, abdominal pain, or significant leg swelling.  She has never been on any inhalers or respiratory treatments.  Denies symptoms of nasal congestion or history respiratory illnesses.  She is a lifelong non-smoker.  Also denies marijuana, vaping, or other substance use.  Denies significant travel history.  Denies risk of exposure to dust, mold,

## 2025-06-10 ENCOUNTER — OFFICE VISIT (OUTPATIENT)
Facility: CLINIC | Age: 50
End: 2025-06-10
Payer: MEDICAID

## 2025-06-10 VITALS
HEIGHT: 68 IN | SYSTOLIC BLOOD PRESSURE: 130 MMHG | TEMPERATURE: 98.5 F | OXYGEN SATURATION: 95 % | DIASTOLIC BLOOD PRESSURE: 80 MMHG | HEART RATE: 100 BPM | RESPIRATION RATE: 18 BRPM | WEIGHT: 220 LBS | BODY MASS INDEX: 33.34 KG/M2

## 2025-06-10 DIAGNOSIS — Z13.29 SCREENING FOR ENDOCRINE, METABOLIC AND IMMUNITY DISORDER: ICD-10-CM

## 2025-06-10 DIAGNOSIS — E55.9 VITAMIN D DEFICIENCY: ICD-10-CM

## 2025-06-10 DIAGNOSIS — J32.0 MAXILLARY SINUSITIS, UNSPECIFIED CHRONICITY: Primary | ICD-10-CM

## 2025-06-10 DIAGNOSIS — Z13.228 SCREENING FOR ENDOCRINE, METABOLIC AND IMMUNITY DISORDER: ICD-10-CM

## 2025-06-10 DIAGNOSIS — Z13.0 SCREENING FOR ENDOCRINE, METABOLIC AND IMMUNITY DISORDER: ICD-10-CM

## 2025-06-10 PROCEDURE — 99213 OFFICE O/P EST LOW 20 MIN: CPT | Performed by: INTERNAL MEDICINE

## 2025-06-10 RX ORDER — BENZONATATE 100 MG/1
100 CAPSULE ORAL 3 TIMES DAILY PRN
Qty: 30 CAPSULE | Refills: 0 | Status: SHIPPED | OUTPATIENT
Start: 2025-06-10 | End: 2025-06-20

## 2025-06-10 RX ORDER — AZITHROMYCIN 250 MG/1
TABLET, FILM COATED ORAL
Qty: 6 TABLET | Refills: 0 | Status: SHIPPED | OUTPATIENT
Start: 2025-06-10 | End: 2025-06-20

## 2025-06-10 RX ORDER — PREDNISONE 10 MG/1
10 TABLET ORAL 2 TIMES DAILY
Qty: 10 TABLET | Refills: 0 | Status: SHIPPED | OUTPATIENT
Start: 2025-06-10 | End: 2025-06-15

## 2025-06-10 SDOH — ECONOMIC STABILITY: FOOD INSECURITY: WITHIN THE PAST 12 MONTHS, THE FOOD YOU BOUGHT JUST DIDN'T LAST AND YOU DIDN'T HAVE MONEY TO GET MORE.: NEVER TRUE

## 2025-06-10 SDOH — ECONOMIC STABILITY: FOOD INSECURITY: WITHIN THE PAST 12 MONTHS, YOU WORRIED THAT YOUR FOOD WOULD RUN OUT BEFORE YOU GOT MONEY TO BUY MORE.: NEVER TRUE

## 2025-06-10 ASSESSMENT — PATIENT HEALTH QUESTIONNAIRE - PHQ9
SUM OF ALL RESPONSES TO PHQ QUESTIONS 1-9: 0
1. LITTLE INTEREST OR PLEASURE IN DOING THINGS: NOT AT ALL
2. FEELING DOWN, DEPRESSED OR HOPELESS: NOT AT ALL
SUM OF ALL RESPONSES TO PHQ QUESTIONS 1-9: 0

## 2025-06-10 ASSESSMENT — ENCOUNTER SYMPTOMS: COUGH: 1

## 2025-06-10 NOTE — PROGRESS NOTES
Hannah Perez presents today for   Chief Complaint   Patient presents with    Cough    Nasal Congestion           \"Have you been to the ER, urgent care clinic since your last visit?  Hospitalized since your last visit?\"    NO    “Have you seen or consulted any other health care providers outside of [facility] since your last visit?”    NO    “Have you had a colorectal cancer screening such as a colonoscopy/FIT/Cologuard?    NO    No colonoscopy on file  No cologuard on file  No FIT/FOBT on file   No flexible sigmoidoscopy on file        Have you had a mammogram?”   NO    Date of last Mammogram: 6/27/2017      “Have you had a pap smear?”    NO    No cervical cancer screening on file          
     Comments: Bilateral nasal and congestion noted.  Bilateral maxillary sinus tenderness.     Mouth/Throat:      Mouth: Mucous membranes are moist.   Eyes:      Extraocular Movements: Extraocular movements intact.      Pupils: Pupils are equal, round, and reactive to light.   Cardiovascular:      Rate and Rhythm: Normal rate and regular rhythm.      Heart sounds: Normal heart sounds.   Pulmonary:      Breath sounds: Normal breath sounds.   Abdominal:      General: Abdomen is flat.      Palpations: Abdomen is soft.   Musculoskeletal:         General: Normal range of motion.      Cervical back: Normal range of motion and neck supple.   Skin:     General: Skin is warm.   Neurological:      General: No focal deficit present.      Mental Status: She is alert. Mental status is at baseline.         We discussed the diagnosis, risks and benefits of medications    Disclaimer:    The patient understands our medical plan.  Alternatives have been explained and offered.  The risks, benefits and significant side effects of all medications have been reviewed. Anticipated time course and progression of condition reviewed. All questions have been addressed.  She is encouraged to employ the information provided in the after visit summary, which was reviewed.      Where appropriate, she is instructed to call the clinic if she has not been notified either by phone or through [employer] with the results of her tests or with an appointment plan for any referrals within 1 week(s).  No news is not good news; it's no news. The patient  is to call if her condition worsens or fails to improve or if significant side effects are experienced.     Aspects of this note may have been generated using voice recognition software. Despite editing, there may be unrecognized errors.                 An electronic signature was used to authenticate this note.  -- Charly Abernathy MD